# Patient Record
Sex: MALE | Race: WHITE | NOT HISPANIC OR LATINO | Employment: FULL TIME | URBAN - METROPOLITAN AREA
[De-identification: names, ages, dates, MRNs, and addresses within clinical notes are randomized per-mention and may not be internally consistent; named-entity substitution may affect disease eponyms.]

---

## 2023-04-06 ENCOUNTER — OFFICE VISIT (OUTPATIENT)
Dept: FAMILY MEDICINE | Facility: CLINIC | Age: 38
End: 2023-04-06
Payer: COMMERCIAL

## 2023-04-06 VITALS
RESPIRATION RATE: 16 BRPM | OXYGEN SATURATION: 94 % | HEART RATE: 91 BPM | TEMPERATURE: 99.8 F | DIASTOLIC BLOOD PRESSURE: 82 MMHG | SYSTOLIC BLOOD PRESSURE: 132 MMHG

## 2023-04-06 DIAGNOSIS — J02.0 STREPTOCOCCAL SORE THROAT: Primary | ICD-10-CM

## 2023-04-06 LAB
DEPRECATED S PYO AG THROAT QL EIA: POSITIVE
FLUAV AG SPEC QL IA: NEGATIVE
FLUBV AG SPEC QL IA: NEGATIVE

## 2023-04-06 PROCEDURE — 87880 STREP A ASSAY W/OPTIC: CPT | Performed by: NURSE PRACTITIONER

## 2023-04-06 PROCEDURE — 99203 OFFICE O/P NEW LOW 30 MIN: CPT | Mod: CS | Performed by: NURSE PRACTITIONER

## 2023-04-06 PROCEDURE — 87804 INFLUENZA ASSAY W/OPTIC: CPT | Performed by: NURSE PRACTITIONER

## 2023-04-06 PROCEDURE — U0005 INFEC AGEN DETEC AMPLI PROBE: HCPCS | Performed by: NURSE PRACTITIONER

## 2023-04-06 PROCEDURE — U0003 INFECTIOUS AGENT DETECTION BY NUCLEIC ACID (DNA OR RNA); SEVERE ACUTE RESPIRATORY SYNDROME CORONAVIRUS 2 (SARS-COV-2) (CORONAVIRUS DISEASE [COVID-19]), AMPLIFIED PROBE TECHNIQUE, MAKING USE OF HIGH THROUGHPUT TECHNOLOGIES AS DESCRIBED BY CMS-2020-01-R: HCPCS | Performed by: NURSE PRACTITIONER

## 2023-04-06 RX ORDER — AZITHROMYCIN 250 MG/1
TABLET, FILM COATED ORAL
Qty: 6 TABLET | Refills: 0 | Status: SHIPPED | OUTPATIENT
Start: 2023-04-06 | End: 2023-04-11

## 2023-04-06 ASSESSMENT — PAIN SCALES - GENERAL: PAINLEVEL: SEVERE PAIN (6)

## 2023-04-06 ASSESSMENT — ENCOUNTER SYMPTOMS
COUGH: 1
CHILLS: 1
FATIGUE: 1
FEVER: 1

## 2023-04-06 NOTE — PROGRESS NOTES
Assessment & Plan     Streptococcal sore throat  Rapid strep positive, allergy to PCN and cefalcor.  Zpak prescribed and we discussed symptomatic care with tylenol, NSAIDs, lozenges, and warm fluids.    - Streptococcus A Rapid Screen w/Reflex to PCR - Clinic Collect  - Influenza A & B Antigen - Clinic Collect  - Symptomatic COVID-19 Virus (Coronavirus) by PCR Nasopharyngeal  - azithromycin (ZITHROMAX) 250 MG tablet; Take 2 tablets (500 mg) by mouth daily for 1 day, THEN 1 tablet (250 mg) daily for 4 days.        PASCUAL Matta CNP  [facility]    Henry Rojas is a 37 year old, presenting for the following health issues:  Pharyngitis (Strep symptoms)      History of Present Illness       Reason for visit:  May have strep throat  Symptom onset:  1-3 days ago  Symptoms include:  Chills, fatigue, sore throat, fever  Symptom intensity:  Moderate  Symptom progression:  Staying the same  Had these symptoms before:  Yes  Has tried/received treatment for these symptoms:  Yes  Previous treatment was successful:  No  What makes it better:  Soup broth, cold fluids    He eats 2-3 servings of fruits and vegetables daily.He consumes 2 sweetened beverage(s) daily.He exercises with enough effort to increase his heart rate 30 to 60 minutes per day.  He exercises with enough effort to increase his heart rate 3 or less days per week.   He is taking medications regularly.       Acute Illness  Acute illness concerns: sore throat, chills, fever  Onset/Duration: Wednesday morning, progressing since  Symptoms:  Fever: YES  Chills/Sweats: YES  Headache (location?): YES  Sinus Pressure: No  Conjunctivitis:  No  Ear Pain: no  Rhinorrhea: No  Congestion: YES  Sore Throat: YES  Cough: YES-productive of green sputum, painful to cough  Wheeze: YES, intermittently  Decreased Appetite: YES  Nausea: YES  Vomiting: No  Diarrhea: No  Dysuria/Freq.: No  Dysuria or Hematuria: No  Fatigue/Achiness: YES  Sick/Strep  Exposure: No  Therapies tried and outcome: tylenol, ibuprofen, robitussin        Review of Systems   Constitutional: Positive for chills, fatigue and fever.   Respiratory: Positive for cough.       Constitutional, HEENT, cardiovascular, pulmonary, gi and gu systems are negative, except as otherwise noted.      Objective    /82 (BP Location: Right arm, Patient Position: Sitting, Cuff Size: Adult Regular)   Pulse 91   Temp 99.8  F (37.7  C) (Temporal)   Resp 16   SpO2 94%   There is no height or weight on file to calculate BMI.  Physical Exam   GENERAL: healthy, alert and no distress  RESP: lungs clear to auscultation - no rales, rhonchi or wheezes  CV: regular rate and rhythm, normal S1 S2, no S3 or S4, no murmur, click or rub, no peripheral edema and peripheral pulses strong  MS: no gross musculoskeletal defects noted, no edema

## 2023-04-07 LAB — SARS-COV-2 RNA RESP QL NAA+PROBE: NEGATIVE

## 2023-05-21 ENCOUNTER — HEALTH MAINTENANCE LETTER (OUTPATIENT)
Age: 38
End: 2023-05-21

## 2024-07-28 ENCOUNTER — HEALTH MAINTENANCE LETTER (OUTPATIENT)
Age: 39
End: 2024-07-28

## 2025-07-17 ENCOUNTER — OFFICE VISIT (OUTPATIENT)
Dept: URGENT CARE | Facility: URGENT CARE | Age: 40
End: 2025-07-17
Payer: COMMERCIAL

## 2025-07-17 VITALS
BODY MASS INDEX: 29.96 KG/M2 | DIASTOLIC BLOOD PRESSURE: 69 MMHG | HEIGHT: 71 IN | OXYGEN SATURATION: 98 % | TEMPERATURE: 98.3 F | HEART RATE: 60 BPM | WEIGHT: 214 LBS | RESPIRATION RATE: 16 BRPM | SYSTOLIC BLOOD PRESSURE: 117 MMHG

## 2025-07-17 DIAGNOSIS — R19.5 LOOSE STOOLS: ICD-10-CM

## 2025-07-17 DIAGNOSIS — S46.911A SHOULDER STRAIN, RIGHT, INITIAL ENCOUNTER: ICD-10-CM

## 2025-07-17 DIAGNOSIS — R21 RASH: Primary | ICD-10-CM

## 2025-07-17 RX ORDER — TRIAMCINOLONE ACETONIDE 0.25 MG/G
OINTMENT TOPICAL 2 TIMES DAILY
Qty: 15 G | Refills: 0 | Status: SHIPPED | OUTPATIENT
Start: 2025-07-17 | End: 2025-07-22

## 2025-07-17 NOTE — PROGRESS NOTES
Assessment & Plan     Shoulder strain, right, initial encounter  - Physical Therapy  Referral    Rash  - triamcinolone (KENALOG) 0.025 % external ointment  Dispense: 15 g; Refill: 0       Uncertain on cause of shoulder injury which started after waking up, the location of discomfort is in area of supraspinatus/deltoid. No clear suggestion of  rotator cuff tear. Offered xray but would like to defer.  Questionable symptoms of impingement but can monitor further if persistent.  There is no clear suggestion of brachial plexus injury or distal nerve compression    Rash is benign appearing no suggestion of shingles.  Start topical steroid cream for few days and take oral antihistamine.     Loose stools for several days but only 1 episode per day could be very well be a mild case of gastroenteritis if symptoms escalate he will seek help otherwise there is low suspicion for surgical abdomen at this time    Ever Mckeon MD   [facility]    Henry Rojas is a 40 year old male who presents to clinic today for the following health issues:  Chief Complaint   Patient presents with    Arm Pain     X4 days of Pain in right arm and shoulder       Rash     X2 days of rash on right arm     Generalized Body Aches    Diarrhea    Urgent Care     HPI  1_  Sunday started to have soreness in shoulder pain has fluctuated - rash on arm, no pain,     Difficulty with raising, left hand dominant. No falls on the R side/arm.   Remedies attempted: ibuprofen    2_   Rash starting 2 days ago. No particularly pain/itching  No hx of hives. No breathing difficulties.   No hx of shingles. 2 days of cortisone use    3_  Diarrhea episodes for 3-4 days - 1 episode per day. Denies abdominal pain  No recent travel travel, no antibiotics. No exposures to illnesses. No fevers. No hx of IBS or IBD  No abdominal surgery history. No recent camping or drinking lake water  No vomiting    There are no active problems to display for  "this patient.      Current Outpatient Medications   Medication Sig Dispense Refill    triamcinolone (KENALOG) 0.025 % external ointment Apply topically 2 times daily for 5 days. 15 g 0     No current facility-administered medications for this visit.           Objective    /69   Pulse 60   Temp 98.3  F (36.8  C) (Temporal)   Resp 16   Ht 1.803 m (5' 11\")   Wt 97.1 kg (214 lb)   SpO2 98%   BMI 29.85 kg/m    Physical Exam   As noted above and including:   GEN: NAD  R arm: volar surface upper arm to forearm blanching lesions erythematous in 2 sections of patches. Non-crusting and no vesicular lesions.   R hand: 5/5  strength, normal elbow ROM, intact sensation  R shoulder flexion slightly restricted at 160 degrees, no pain with external rotation, questionable vides ramona      No results found for any visits on 07/17/25.    CrCl cannot be calculated (No successful lab value found.).                  The use of Dragon/[employer] dictation services may have been used to construct the content in this note; any grammatical or spelling errors are non-intentional. Please contact the author of this note directly if you are in need of any clarification.   "

## 2025-07-17 NOTE — PROGRESS NOTES
Urgent Care Clinic Visit    Chief Complaint   Patient presents with    Arm Pain     X4 days of Pain in right arm and shoulder       Rash     X2 days of rash on right arm     Generalized Body Aches    Diarrhea    Urgent Care               7/17/2025     6:33 PM   Additional Questions   Roomed by salvador rhoades

## 2025-07-17 NOTE — PATIENT INSTRUCTIONS
If shoulder discomfort is not better in next few days make an appointment to see physical therapy  If symptoms worsen seek help right away        Claritin take once daily for next 5-7 days  Topical steroid cream twice a day for 3-5 days      If stools worsen -- such as bloody stools, increasing frequency , new abdominal pain, fever -- seek help right away

## 2025-07-21 ENCOUNTER — OFFICE VISIT (OUTPATIENT)
Dept: INTERNAL MEDICINE | Facility: CLINIC | Age: 40
End: 2025-07-21
Payer: COMMERCIAL

## 2025-07-21 VITALS
TEMPERATURE: 97.9 F | DIASTOLIC BLOOD PRESSURE: 80 MMHG | RESPIRATION RATE: 13 BRPM | BODY MASS INDEX: 29.71 KG/M2 | SYSTOLIC BLOOD PRESSURE: 120 MMHG | WEIGHT: 212.2 LBS | HEIGHT: 71 IN | OXYGEN SATURATION: 98 % | HEART RATE: 63 BPM

## 2025-07-21 DIAGNOSIS — B02.9 HERPES ZOSTER WITHOUT COMPLICATION: Primary | ICD-10-CM

## 2025-07-21 PROCEDURE — 3074F SYST BP LT 130 MM HG: CPT | Performed by: INTERNAL MEDICINE

## 2025-07-21 PROCEDURE — 99213 OFFICE O/P EST LOW 20 MIN: CPT | Performed by: INTERNAL MEDICINE

## 2025-07-21 PROCEDURE — 3079F DIAST BP 80-89 MM HG: CPT | Performed by: INTERNAL MEDICINE

## 2025-07-21 RX ORDER — IBUPROFEN 200 MG
200 TABLET ORAL EVERY 4 HOURS PRN
COMMUNITY

## 2025-07-21 RX ORDER — HYDROCODONE BITARTRATE AND ACETAMINOPHEN 5; 325 MG/1; MG/1
1-2 TABLET ORAL EVERY 6 HOURS PRN
Qty: 60 TABLET | Refills: 0 | Status: SHIPPED | OUTPATIENT
Start: 2025-07-21

## 2025-07-21 RX ORDER — VALACYCLOVIR HYDROCHLORIDE 1 G/1
1000 TABLET, FILM COATED ORAL 3 TIMES DAILY
Qty: 21 TABLET | Refills: 0 | Status: SHIPPED | OUTPATIENT
Start: 2025-07-21 | End: 2025-07-28

## 2025-07-21 NOTE — LETTER
07/21/25      Benjamin Catalan  1985  [address]  [facility] [address]        To whom it may concern,    Please excuse Mr. Catalan from work this week. He will be needing time to rest and recuperate from an illness that I am seeing him for. He may return to work next week without restrictions.    Please contact me with any question or concerns.        Kayleigh Acevedo MD   [place] [address] [address] [place] [id number]  T: [phone], F: [phone]

## 2025-07-21 NOTE — PROGRESS NOTES
"  ASSESSMENT/PLAN                                                      (B02.9) Herpes zoster without complication  (primary encounter diagnosis)  Comment: involving right sided C4/C5 dermatome; debilitating pain; day 6 of rash - low likelihood of significant/rapid improvement with Valtrex (but worth a try); at risk for post-herpetic neuralgia.   Plan: course of Valtrex provided; Norco provided for pain relief; letter provided for work; if symptoms worsen, change, do not improve, or new symptoms arise, patient to contact MD.      Kayleigh Acevedo MD   [address] [address]  T: [phone], F: [phone]    SUBJECTIVE                                                      Benjamin Catalan is a very pleasant 40 year old male who presents with a painful rash:    Rash started last Tuesday.  Evaluated in urgent care on Thursday.  Since then rash has significantly worsened: now involves his entire arm and shoulder and is severely painful.    No other active medical problems.    OBJECTIVE                                                      /80 (BP Location: Left arm, Patient Position: Sitting, Cuff Size: Adult Large)   Pulse 63   Temp 97.9  F (36.6  C) (Temporal)   Resp 13   Ht 1.803 m (5' 11\")   Wt 96.3 kg (212 lb 3.2 oz)   SpO2 98%   BMI 29.60 kg/m    Constitutional: uncomfortable-appearing   Integumentary: multiple erythematous patches with overlying vesicles covering entire right anterior arm and posterior shoulder    ---    (Note documentation was completed, in part, with [employer] voice-recognition software. Documentation was reviewed, but some grammatical, spelling, and word errors may remain.)    "